# Patient Record
Sex: MALE | Race: WHITE
[De-identification: names, ages, dates, MRNs, and addresses within clinical notes are randomized per-mention and may not be internally consistent; named-entity substitution may affect disease eponyms.]

---

## 2017-09-04 ENCOUNTER — HOSPITAL ENCOUNTER (EMERGENCY)
Dept: HOSPITAL 17 - PHED | Age: 62
Discharge: HOME | End: 2017-09-04
Payer: SELF-PAY

## 2017-09-04 VITALS
SYSTOLIC BLOOD PRESSURE: 117 MMHG | HEART RATE: 99 BPM | TEMPERATURE: 97.4 F | OXYGEN SATURATION: 99 % | RESPIRATION RATE: 16 BRPM | DIASTOLIC BLOOD PRESSURE: 75 MMHG

## 2017-09-04 VITALS — HEIGHT: 69 IN | BODY MASS INDEX: 21.88 KG/M2 | WEIGHT: 147.71 LBS

## 2017-09-04 DIAGNOSIS — E78.00: ICD-10-CM

## 2017-09-04 DIAGNOSIS — Z87.19: ICD-10-CM

## 2017-09-04 DIAGNOSIS — Z86.59: ICD-10-CM

## 2017-09-04 DIAGNOSIS — Z86.69: ICD-10-CM

## 2017-09-04 DIAGNOSIS — Z85.118: ICD-10-CM

## 2017-09-04 DIAGNOSIS — R04.0: Primary | ICD-10-CM

## 2017-09-04 DIAGNOSIS — Z79.01: ICD-10-CM

## 2017-09-04 DIAGNOSIS — E07.9: ICD-10-CM

## 2017-09-04 PROCEDURE — 99281 EMR DPT VST MAYX REQ PHY/QHP: CPT

## 2017-09-04 NOTE — PD
HPI


Chief Complaint:  Nosebleed


Time Seen by Provider:  14:15


Travel History


International Travel<30 days:  No


Contact w/Intl Traveler<30days:  No


Traveled to known affect area:  No





History of Present Illness


HPI


62-year-old male patient presents to the ER today, is on Plavix, and started 

having bleeding from his right nostril today.  He denies any other issues.  He 

states that he has had a previous nosebleed since he had been started on Plavix.





Modifying Factors: None


Associated Signs & Symptoms: Epistaxis


Risk Factors: On Plavix





PFSH


Past Medical History


Depression:  Yes


Cancer:  Yes (LUNG REMISSION)


Cardiovascular Problems:  No


High Cholesterol:  Yes


Chemotherapy:  Yes (2009- X 1 ROUND)


Diabetes:  No


Endocrine:  No


GERD:  Yes


Glaucoma:  No


Genitourinary:  No


Hepatitis:  No


Hiatal Hernia:  No


Hypertension:  No


Immune Disorder:  No


Implanted Vascular Access Dvce:  Yes (PORT REMOVED)


Musculoskeletal:  No


Neurologic:  Yes


Psychiatric:  Yes


Reproductive:  No


Respiratory:  Yes (LUNG CANCER )


Immunizations Current:  Yes


Radiation Therapy:  Yes (2009- X 2 ROUNDS)


Thyroid Disease:  Yes


Tetanus Vaccination:  > 5 Years


Influenza Vaccination:  Yes





Past Surgical History


Body Medical Devices:  INFUSAPORT


Cardiac Surgery:  Yes (VALVE REPLACEMENT)


Oral Surgery:  Yes (BRONCHOSCOPY POLYP REMOVAL)


Thoracic Surgery:  Yes (THORACOTOMY 2010)


Other Surgery:  Yes (RIGHT THORACOTOMY,RIGHT LOBECTOMY PLEURECTOMY, BRONC)





Social History


Alcohol Use:  No


Tobacco Use:  No (5 CIGS A DAY)


Substance Use:  No





Allergies-Medications


(Allergen,Severity, Reaction):  


Coded Allergies:  


     No Known Allergies (Verified , 9/4/17)


Reported Meds & Prescriptions





Reported Meds & Active Scripts


Active


Reported


Omeprazole 20 Mg Tab 20 Mg PO DAILY


Lipitor (Atorvastatin Calcium) 20 Mg Tab 20 Mg PO HS


Synthroid (Levothyroxine Sodium) 50 Mcg Tab 50 Mcg PO DAILY


Plavix (Clopidogrel Bisulfate) 75 Mg Tab 75 Mg PO DAILY








Review of Systems


Except as stated in HPI:  all other systems reviewed are Neg





Physical Exam


Narrative


GENERAL: Well-developed elderly white male patient currently in mild distress.  

Awake and oriented 3.


SKIN: Focused skin assessment warm/dry.


HEAD: Atraumatic. Normocephalic. 


EYES: Pupils equal and round. No scleral icterus. No injection or drainage. 


ENT: No nasal bleeding or discharge.  Mucous membranes pink and moist.  There 

is notable right nostril small amount of blood with no obvious active bleeding.


NECK: Trachea midline. No JVD. 


CARDIOVASCULAR: Regular rate and rhythm.  No murmur appreciated.


RESPIRATORY: No accessory muscle use. Clear to auscultation. Breath sounds 

equal bilaterally. 


GASTROINTESTINAL: Abdomen soft, non-tender, nondistended. Hepatic and splenic 

margins not palpable. 


MUSCULOSKELETAL: No obvious deformities. No clubbing.  No cyanosis.  No edema. 


NEUROLOGICAL: Awake and alert. No obvious cranial nerve deficits.  Motor 

grossly within normal limits. Normal speech.


PSYCHIATRIC: Appropriate mood and affect; insight and judgment normal.





Data


Data


Last Documented VS





Vital Signs








  Date Time  Temp Pulse Resp B/P (MAP) Pulse Ox O2 Delivery O2 Flow Rate FiO2


 


9/4/17 13:50 97.4 99 16 117/75 (89) 99   











MDM


Medical Decision Making


Medical Screen Exam Complete:  Yes


Emergency Medical Condition:  Yes


Medical Record Reviewed:  Yes


Differential Diagnosis


Epistaxis


Narrative Course


At this point, the bleeding has stopped.  Vital signs are stable.  And I have 

talked to the patient regarding obtaining a Rhino Rocket so that the bleeding 

does not continue further.  He discussed the issue with his wife and at this 

time is declining stating that he will come back should he start bleeding 

again.  Return for any worsening in bleeding or new issues as needed.  The plan 

has discussed with him and he states understanding.





Diagnosis





 Primary Impression:  


 Epistaxis


Disposition:  01 DISCHARGE HOME


Condition:  Stable











Sheri Avila MD Sep 4, 2017 14:17

## 2018-01-04 ENCOUNTER — HOSPITAL ENCOUNTER (EMERGENCY)
Dept: HOSPITAL 17 - PHED | Age: 63
LOS: 1 days | Discharge: HOME | End: 2018-01-05
Payer: COMMERCIAL

## 2018-01-04 VITALS
DIASTOLIC BLOOD PRESSURE: 61 MMHG | RESPIRATION RATE: 20 BRPM | TEMPERATURE: 97.9 F | SYSTOLIC BLOOD PRESSURE: 112 MMHG | OXYGEN SATURATION: 100 % | HEART RATE: 94 BPM

## 2018-01-04 DIAGNOSIS — Z79.82: ICD-10-CM

## 2018-01-04 DIAGNOSIS — F17.210: ICD-10-CM

## 2018-01-04 DIAGNOSIS — E78.00: ICD-10-CM

## 2018-01-04 DIAGNOSIS — Z23: ICD-10-CM

## 2018-01-04 DIAGNOSIS — K21.9: ICD-10-CM

## 2018-01-04 DIAGNOSIS — M54.10: Primary | ICD-10-CM

## 2018-01-04 DIAGNOSIS — Z85.118: ICD-10-CM

## 2018-01-04 DIAGNOSIS — F32.9: ICD-10-CM

## 2018-01-04 DIAGNOSIS — E07.9: ICD-10-CM

## 2018-01-04 PROCEDURE — 90714 TD VACC NO PRESV 7 YRS+ IM: CPT

## 2018-01-04 PROCEDURE — 90471 IMMUNIZATION ADMIN: CPT

## 2018-01-04 PROCEDURE — 73590 X-RAY EXAM OF LOWER LEG: CPT

## 2018-01-04 PROCEDURE — 93971 EXTREMITY STUDY: CPT

## 2018-01-04 NOTE — PD
HPI


Chief Complaint:  Musculoskeletal Complaint


Time Seen by Provider:  21:57


Travel History


International Travel<30 days:  No


Contact w/Intl Traveler<30days:  No


Traveled to known affect area:  No





History of Present Illness


HPI


Patient 62-year-old male presents emergency department for evaluation of lower 

extremity wound and pain.  Patient states that he dropped something on his leg 

at work several weeks ago, he states since then the pain started radiating up 

his right leg, states he has not noticed any swelling but he does have a 

history of cancer as well as a valve replacement and has not tolerated Coumadin 

in the past and therefore is on aspirin.  He went and care facility today 

seeking pain treatment and was referred to the emergency department to rule out 

DVT as fracture.  Patient states the pain is gradually worsened over the past 

few days, context as above, denies any shortness of breath difficulty 

ambulating.  Tetanus status unknown.





PFSH


Past Medical History


Hx Anticoagulant Therapy:  Yes (VALVE REPLACEMNET TWO MONTHS AGO , WAS ON 

COUMADIN BRIEFLY )


Depression:  Yes


Cancer:  Yes (LUNG REMISSION)


Cardiovascular Problems:  No


High Cholesterol:  Yes


Chemotherapy:  Yes (2009- X 1 ROUND)


Diabetes:  No


Endocrine:  No


GERD:  Yes


Glaucoma:  No


Genitourinary:  No


Hepatitis:  No


Hiatal Hernia:  No


Hypertension:  No


Immune Disorder:  No


Implanted Vascular Access Dvce:  Yes (PORT REMOVED)


Musculoskeletal:  No


Neurologic:  Yes


Psychiatric:  Yes


Reproductive:  No


Respiratory:  Yes (LUNG CANCER )


Immunizations Current:  Yes


Radiation Therapy:  Yes (2009- X 2 ROUNDS)


Thyroid Disease:  Yes


Tetanus Vaccination:  Unknown


Influenza Vaccination:  Yes





Past Surgical History


Body Medical Devices:  INFUSAPORT


Cardiac Surgery:  Yes (VALVE REPLACEMENT)


Oral Surgery:  Yes (BRONCHOSCOPY POLYP REMOVAL)


Thoracic Surgery:  Yes (THORACOTOMY 2010)


Other Surgery:  Yes (RIGHT THORACOTOMY,RIGHT LOBECTOMY PLEURECTOMY, BRONC)





Social History


Alcohol Use:  No


Tobacco Use:  No (5 CIGS A DAY)


Substance Use:  No





Allergies-Medications


(Allergen,Severity, Reaction):  


Coded Allergies:  


     No Known Allergies (Verified  Adverse Reaction, Unknown, 1/4/18)


Reported Meds & Prescriptions





Reported Meds & Active Scripts


Active


Ultram (Tramadol HCl) 50 Mg Tab 50 Mg PO Q6H PRN


Reported


Aspirin 81 Mg Chew 81 Mg CHEW ONCE


Omeprazole 20 Mg Tab 20 Mg PO DAILY


Lipitor (Atorvastatin Calcium) 20 Mg Tab 20 Mg PO HS


Synthroid (Levothyroxine Sodium) 50 Mcg Tab 50 Mcg PO DAILY








Review of Systems


Except as stated in HPI:  all other systems reviewed are Neg





Physical Exam


Narrative


GENERAL: Well-developed well-nourished no obvious distress


SKIN: Focused skin assessment warm/dry.  There is small abrasion over the 

anterior tibia on the right lower extremity, no swelling appreciated, no 

bleeding.


HEAD: Atraumatic. Normocephalic. 


EYES: Pupils equal and round. No scleral icterus. No injection or drainage. 


ENT: No nasal bleeding or discharge.  Mucous membranes pink and moist.


NECK: Trachea midline. No JVD. 


CARDIOVASCULAR: Regular rate and rhythm.  No murmur appreciated.


RESPIRATORY: No accessory muscle use. Clear to auscultation. Breath sounds 

equal bilaterally. 


GASTROINTESTINAL: Abdomen soft, non-tender, nondistended. Hepatic and splenic 

margins not palpable. 


MUSCULOSKELETAL: No obvious deformities. No clubbing.  No cyanosis.  No edema.  

2+ bilateral equal pulses in all 4 extremities, no edema, Homans sign negative, 

patient states the pain radiates all the way up his thigh no cordlike 

structures palpated.  Full nontender range of motion of all joints of the lower 

extremities.  5 out of 5 strength equal bilaterally.


NEUROLOGICAL: Awake and alert. No obvious cranial nerve deficits.  Motor 

grossly within normal limits. Normal speech.


PSYCHIATRIC: Appropriate mood and affect; insight and judgment normal.





Data


Data


Last Documented VS





Orders





 Orders


Us Leg Venous Doppler (1/4/18 22:06)


Tibia/Fibula (Ap/Lat) (1/4/18 )


Tetanus/Diphtheria Tox Adult (Tetanus/Di (1/5/18 01:00)


Ed Discharge Order (1/5/18 01:00)








[facility]


Medical Decision Making


Medical Screen Exam Complete:  Yes


Emergency Medical Condition:  Yes


Differential Diagnosis


DVT, fracture, radiculopathy, neurogenic pain.


Narrative Course





Last 24 hours Impressions








Lower Extremity Ultrasound 1/4/18 2206 Signed





Impressions: 





 Service Date/Time:  Thursday, January 4, 2018 23:50 - CONCLUSION:  No evidence 





 of right lower extremity DVT.     Isidro Quiroga MD 


 


Tibia/Fibula X-Ray 1/4/18 0000 Signed





Impressions: 





 Service Date/Time:  Thursday, January 4, 2018 22:30 - CONCLUSION: No acute 





 disease.       Aureliano Lynne MD 





Patient reassured, tetanus status updated, discussed pain management as well as 

need for follow-up the primary care physician for workup of possible 

radiculopathy.  Nerve is no midline CT or L-spine tendernes and therefore no 

indication further workup at this time.  He stable for discharge discussed 

return to ED criteria.





Diagnosis





 Primary Impression:  


 Radiculopathy


 Qualified Codes:  M54.10 - Radiculopathy, site unspecified


***Med/Other Pt SpecificInfo:  Prescription(s) given


Scripts


Tramadol (Ultram) 50 Mg Tab


50 MG PO Q6H Y for PAIN, #15 TAB 0 Refills


   Prov: Álvaro Sadler MD         1/5/18


Disposition:  01 DISCHARGE HOME


Condition:  Stable











Álvaro Sadler MD Jan 4, 2018 22:06

## 2018-01-04 NOTE — RADRPT
EXAM DATE/TIME:  01/04/2018 22:30 

 

[facility] COMPARISON:     

No previous studies available for comparison.

 

                     

INDICATIONS :     

Hit in distal tibia with an angle plate at work a week ago. Pain started to radiate up medial leg int
o groin area.

                     

 

MEDICAL HISTORY :     

None.          

 

SURGICAL HISTORY :     

None.   

 

ENCOUNTER:     

Initial                                        

 

ACUITY:     

1 week      

 

PAIN SCORE:     

7/10

 

LOCATION:     

Right  Distal Tibia

 

FINDINGS:     

Two view examination of the right tibia demonstrates no evidence of fracture or dislocation.  Bony mi
neralization is normal.  The soft tissue structures are intact.

 

CONCLUSION:     No acute disease.  

 

 

 

 Aureliano Lynne MD on January 04, 2018 at 22:41           

Board Certified Radiologist.

 This report was verified electronically.

## 2018-01-05 VITALS
RESPIRATION RATE: 16 BRPM | SYSTOLIC BLOOD PRESSURE: 105 MMHG | HEART RATE: 81 BPM | DIASTOLIC BLOOD PRESSURE: 71 MMHG | OXYGEN SATURATION: 98 %

## 2018-01-05 NOTE — RADRPT
EXAM DATE/TIME:  01/04/2018 23:50 

 

[facility] COMPARISON:     

No previous studies available for comparison.

        

 

 

INDICATIONS :                

Right leg pain.

            

 

MEDICAL HISTORY :     

Carcinoma, lung.  Hypercholesterolemia.    Thyroid disease. Numbness, feet. GERD. Depression. Tobacco
 use. Anticoagulant therapy, Aspirin 81mg. 

 

SURGICAL HISTORY :         

Bronchoscopy polyp removal. Cardiac valve replacement. Right thoracotomy.  Right lobectomy pleurectom
y. 

 

ENCOUNTER:     

Initial

 

ACUITY:     

2 weeks

 

PAIN SCORE:      

3/10

 

LOCATION:      

Right  leg.

            

                      

 

TECHNIQUE:     

Venous ultrasound of the leg was performed from the inguinal ligament to the proximal calf.  Real-doreen
e, color Doppler and spectral tracing, compression and augmentation techniques were used.  

 

FINDINGS:     

There is normal compressibility of the deep venous system from the inguinal region to the proximal ca
lf.  No echogenic clot is seen in the lumen of the common femoral, femoral, popliteal, and posterior 
tibial veins.  There is a normal response of the venous system to proximal and distal augmentation an
d respiration.  

 

CONCLUSION:     

No evidence of right lower extremity DVT.

 

 

 

 Isidro Quiroga MD on January 05, 2018 at 0:34           

Board Certified Radiologist.

 This report was verified electronically.